# Patient Record
Sex: MALE | Race: BLACK OR AFRICAN AMERICAN | NOT HISPANIC OR LATINO | Employment: FULL TIME | URBAN - METROPOLITAN AREA
[De-identification: names, ages, dates, MRNs, and addresses within clinical notes are randomized per-mention and may not be internally consistent; named-entity substitution may affect disease eponyms.]

---

## 2021-08-24 ENCOUNTER — CLINICAL SUPPORT (OUTPATIENT)
Dept: URGENT CARE | Facility: CLINIC | Age: 66
End: 2021-08-24
Payer: MEDICAID

## 2021-08-24 DIAGNOSIS — Z78.9 NO KNOWN HEALTH PROBLEMS: Primary | ICD-10-CM

## 2021-08-24 LAB
CTP QC/QA: YES
SARS-COV-2 RDRP RESP QL NAA+PROBE: NEGATIVE

## 2021-08-24 PROCEDURE — 87635: ICD-10-PCS | Mod: QW,S$GLB,, | Performed by: FAMILY MEDICINE

## 2021-08-24 PROCEDURE — 99211 PR OFFICE/OUTPT VISIT, EST, LEVL I: ICD-10-PCS | Mod: S$GLB,,, | Performed by: FAMILY MEDICINE

## 2021-08-24 PROCEDURE — 87635 SARS-COV-2 COVID-19 AMP PRB: CPT | Mod: QW,S$GLB,, | Performed by: FAMILY MEDICINE

## 2021-08-24 PROCEDURE — 99211 OFF/OP EST MAY X REQ PHY/QHP: CPT | Mod: S$GLB,,, | Performed by: FAMILY MEDICINE

## 2023-05-18 ENCOUNTER — HOSPITAL ENCOUNTER (EMERGENCY)
Facility: HOSPITAL | Age: 68
Discharge: HOME | End: 2023-05-18
Attending: EMERGENCY MEDICINE
Payer: MEDICARE

## 2023-05-18 VITALS
TEMPERATURE: 98 F | RESPIRATION RATE: 16 BRPM | BODY MASS INDEX: 30.24 KG/M2 | SYSTOLIC BLOOD PRESSURE: 149 MMHG | HEIGHT: 71 IN | OXYGEN SATURATION: 95 % | HEART RATE: 69 BPM | DIASTOLIC BLOOD PRESSURE: 87 MMHG | WEIGHT: 216 LBS

## 2023-05-18 DIAGNOSIS — R22.1 NECK MASS: ICD-10-CM

## 2023-05-18 DIAGNOSIS — J02.9 SORE THROAT: Primary | ICD-10-CM

## 2023-05-18 PROBLEM — R51.9 RIGHT FACIAL PAIN: Status: ACTIVE | Noted: 2023-05-18

## 2023-05-18 PROBLEM — H92.01 EAR PAIN, RIGHT: Status: ACTIVE | Noted: 2023-05-18

## 2023-05-18 LAB
BASOPHILS # BLD AUTO: 0.02 K/UL (ref 0–0.2)
BASOPHILS NFR BLD: 0.3 % (ref 0–1.9)
BUN SERPL-MCNC: 18 MG/DL (ref 6–30)
CHLORIDE SERPL-SCNC: 104 MMOL/L (ref 95–110)
CREAT SERPL-MCNC: 0.9 MG/DL (ref 0.5–1.4)
DIFFERENTIAL METHOD: ABNORMAL
EOSINOPHIL # BLD AUTO: 0 K/UL (ref 0–0.5)
EOSINOPHIL NFR BLD: 0.6 % (ref 0–8)
ERYTHROCYTE [DISTWIDTH] IN BLOOD BY AUTOMATED COUNT: 12.3 % (ref 11.5–14.5)
GLUCOSE SERPL-MCNC: 115 MG/DL (ref 70–110)
GROUP A STREP, MOLECULAR: NEGATIVE
HCT VFR BLD AUTO: 42.5 % (ref 40–54)
HCT VFR BLD CALC: 43 %PCV (ref 36–54)
HCV AB SERPL QL IA: NORMAL
HGB BLD-MCNC: 14.2 G/DL (ref 14–18)
HIV 1+2 AB+HIV1 P24 AG SERPL QL IA: NORMAL
IMM GRANULOCYTES # BLD AUTO: 0.02 K/UL (ref 0–0.04)
IMM GRANULOCYTES NFR BLD AUTO: 0.3 % (ref 0–0.5)
LYMPHOCYTES # BLD AUTO: 0.7 K/UL (ref 1–4.8)
LYMPHOCYTES NFR BLD: 10.7 % (ref 18–48)
MCH RBC QN AUTO: 31.2 PG (ref 27–31)
MCHC RBC AUTO-ENTMCNC: 33.4 G/DL (ref 32–36)
MCV RBC AUTO: 93 FL (ref 82–98)
MONOCYTES # BLD AUTO: 0.6 K/UL (ref 0.3–1)
MONOCYTES NFR BLD: 8.4 % (ref 4–15)
NEUTROPHILS # BLD AUTO: 5.2 K/UL (ref 1.8–7.7)
NEUTROPHILS NFR BLD: 79.7 % (ref 38–73)
NRBC BLD-RTO: 0 /100 WBC
PLATELET # BLD AUTO: 141 K/UL (ref 150–450)
PMV BLD AUTO: 9.8 FL (ref 9.2–12.9)
POC IONIZED CALCIUM: 1.14 MMOL/L (ref 1.06–1.42)
POC TCO2 (MEASURED): 23 MMOL/L (ref 23–29)
POTASSIUM BLD-SCNC: 3.7 MMOL/L (ref 3.5–5.1)
RBC # BLD AUTO: 4.55 M/UL (ref 4.6–6.2)
SAMPLE: ABNORMAL
SODIUM BLD-SCNC: 139 MMOL/L (ref 136–145)
WBC # BLD AUTO: 6.55 K/UL (ref 3.9–12.7)

## 2023-05-18 PROCEDURE — 63600175 PHARM REV CODE 636 W HCPCS: Performed by: EMERGENCY MEDICINE

## 2023-05-18 PROCEDURE — 80047 BASIC METABLC PNL IONIZED CA: CPT

## 2023-05-18 PROCEDURE — 96365 THER/PROPH/DIAG IV INF INIT: CPT

## 2023-05-18 PROCEDURE — 25500020 PHARM REV CODE 255: Performed by: EMERGENCY MEDICINE

## 2023-05-18 PROCEDURE — 99284 EMERGENCY DEPT VISIT MOD MDM: CPT | Mod: ,,, | Performed by: EMERGENCY MEDICINE

## 2023-05-18 PROCEDURE — 87651 STREP A DNA AMP PROBE: CPT | Performed by: EMERGENCY MEDICINE

## 2023-05-18 PROCEDURE — 87389 HIV-1 AG W/HIV-1&-2 AB AG IA: CPT | Performed by: PHYSICIAN ASSISTANT

## 2023-05-18 PROCEDURE — 86803 HEPATITIS C AB TEST: CPT | Performed by: PHYSICIAN ASSISTANT

## 2023-05-18 PROCEDURE — 25000003 PHARM REV CODE 250: Performed by: EMERGENCY MEDICINE

## 2023-05-18 PROCEDURE — 99285 EMERGENCY DEPT VISIT HI MDM: CPT | Mod: 25

## 2023-05-18 PROCEDURE — 99284 PR EMERGENCY DEPT VISIT,LEVEL IV: ICD-10-PCS | Mod: ,,, | Performed by: EMERGENCY MEDICINE

## 2023-05-18 PROCEDURE — 96375 TX/PRO/DX INJ NEW DRUG ADDON: CPT | Mod: 59

## 2023-05-18 PROCEDURE — 85025 COMPLETE CBC W/AUTO DIFF WBC: CPT | Performed by: EMERGENCY MEDICINE

## 2023-05-18 RX ORDER — KETOROLAC TROMETHAMINE 30 MG/ML
15 INJECTION, SOLUTION INTRAMUSCULAR; INTRAVENOUS
Status: COMPLETED | OUTPATIENT
Start: 2023-05-18 | End: 2023-05-18

## 2023-05-18 RX ORDER — DEXAMETHASONE SODIUM PHOSPHATE 4 MG/ML
10 INJECTION, SOLUTION INTRA-ARTICULAR; INTRALESIONAL; INTRAMUSCULAR; INTRAVENOUS; SOFT TISSUE
Status: COMPLETED | OUTPATIENT
Start: 2023-05-18 | End: 2023-05-18

## 2023-05-18 RX ADMIN — KETOROLAC TROMETHAMINE 15 MG: 30 INJECTION, SOLUTION INTRAMUSCULAR; INTRAVENOUS at 11:05

## 2023-05-18 RX ADMIN — DEXAMETHASONE SODIUM PHOSPHATE 10 MG: 4 INJECTION INTRA-ARTICULAR; INTRALESIONAL; INTRAMUSCULAR; INTRAVENOUS; SOFT TISSUE at 11:05

## 2023-05-18 RX ADMIN — AMPICILLIN SODIUM AND SULBACTAM SODIUM 3 G: 2; 1 INJECTION, POWDER, FOR SOLUTION INTRAMUSCULAR; INTRAVENOUS at 11:05

## 2023-05-18 RX ADMIN — IOHEXOL 75 ML: 350 INJECTION, SOLUTION INTRAVENOUS at 01:05

## 2023-05-18 NOTE — CONSULTS
[facility] - Emergency Dept  Otorhinolaryngology-Head & Neck Surgery  Consult Note    Patient Name: Yassine Thornton  MRN: [id number]  Code Status: No Order  Admission Date: 5/18/2023  Hospital Length of Stay: 0 days  Attending Physician: No att. providers found  Primary Care Provider: Primary Doctor No    Patient information was obtained from patient and ER records.     Inpatient consult to ENT  Consult performed by: Kassidy Farr MD  Consult ordered by: Edinson Galeano DO        Subjective:     Chief Complaint/Reason for Admission: right ear and facial pain    History of Present Illness: This is a 67 yo male with history of HTN and hairy cell leukemia treated with chemotherapy who presents with right sided ear and facial pain and swelling x 1 day. Reports that pain started in his ear last night and progressed down to his jaw. Endorses odynophagia. Denies fevers, chills, otorrhea, rhinorrhea, shortness of breath, recent infections, weight loss. He is a non-smoker and non-drinker. CT necks shows enhancement of soft tissue in the right palatine tonsil extending along the glossotonsillar sulcus with an enlarged cervical right sided lymph node. ED treating for infectious cause but concern for malignancy prompted ENT consult. Patient has been treated with Decadron 10mg and Unasyn x 1 with improvement of symptoms.       Medications:  Continuous Infusions:  Scheduled Meds:  PRN Meds:     No current facility-administered medications on file prior to encounter.     No current outpatient medications on file prior to encounter.       Review of patient's allergies indicates:  Not on File    No past medical history on file.  No past surgical history on file.  Family History    None       Tobacco Use    Smoking status: Not on file    Smokeless tobacco: Not on file   Substance and Sexual Activity    Alcohol use: Not on file    Drug use: Not on file    Sexual activity: Not on file     Review of Systems  Objective:     Vital Signs (Most  Recent):  Temp: 98.4 °F (36.9 °C) (05/18/23 1432)  Pulse: 69 (05/18/23 1432)  Resp: 16 (05/18/23 1432)  BP: (!) 149/87 (05/18/23 1432)  SpO2: 95 % (05/18/23 1432) Vital Signs (24h Range):  Temp:  [98.4 °F (36.9 °C)-98.5 °F (36.9 °C)] 98.4 °F (36.9 °C)  Pulse:  [69-82] 69  Resp:  [16] 16  SpO2:  [95 %-97 %] 95 %  BP: (125-149)/(85-87) 149/87     Weight: 98 kg (216 lb)  Body mass index is 30.13 kg/m².         Physical Exam  NAD  EOMI  CNs grossly intact  OP: MMM, uvula midline, fullness and mild erythema to the right tonsil, firm to deep palpation, non-tender.  Neck: Enlarged level 2b LN, tender to palpation, firm, mobile       Significant Labs:  Recent Lab Results         05/18/23  1104   05/18/23  1058   05/18/23  1057        Group A Strep, Molecular   Negative  Comment: Arcanobacterium haemolyticum and Beta Streptococcus group C   and G will not be detected by this test method.  Please order   Throat Culture ([id number]) if suspected.           Baso #     0.02       Basophil %     0.3       Differential Method     Automated       Eos #     0.0       Eosinophil %     0.6       Gran # (ANC)     5.2       Gran %     79.7       Hematocrit     42.5       Hemoglobin     14.2       Hepatitis C Ab     Non-reactive       HIV 1/2 Ag/Ab     Non-reactive       Immature Grans (Abs)     0.02  Comment: Mild elevation in immature granulocytes is non specific and   can be seen in a variety of conditions including stress response,   acute inflammation, trauma and pregnancy. Correlation with other   laboratory and clinical findings is essential.         Immature Granulocytes     0.3       Lymph #     0.7       Lymph %     10.7       MCH     31.2       MCHC     33.4       MCV     93       Mono #     0.6       Mono %     8.4       MPV     9.8       nRBC     0       Platelets     141       POC BUN 18           POC Chloride 104           POC Creatinine 0.9           POC Glucose 115           POC Hematocrit 43           POC Ionized Calcium  1.14           POC Potassium 3.7           POC Sodium 139           POC TCO2 (MEASURED) 23           RBC     4.55       RDW     12.3       Sample JIMBO           WBC     6.55               Significant Diagnostics:  CT neck with contrast 5/18/23  1. Assessment of the oral cavity and oropharynx is markedly compromised by artifact related to prior dental restorations. Noting this, there is suggestion of peripheral enhancing soft tissue centered in the region of the right palatine tonsil extending along the glossotonsillar sulcus, difficult to measure precisely given the above-described artifact, but measuring roughly 19 x 9 x 17 mm.  This location of this abnormality could contribute to ipsilateral referred otalgia in the given clinical context.  Finding is concerning for possible malignancy, with infectious or inflammatory etiology additionally considered. Correlation with direct visualization and possible biopsy would be recommended.    2. Prominent number and mildly enlarged upper right cervical lymph nodes, likely related to the above.  Attention on follow-up would be recommended.      Assessment/Plan:     Right facial pain  69 yo male with history of HTN and hairy cell leukemia treated with chemotherapy presenting with new onset on right ear and facial pain and swelling. CT demonstrates enhancement of soft tissue in the right palatine tonsil extending along the glossotonsillar sulcus with an enlarged cervical right sided lymph node. Differential includes infectious etiology vs malignant process. He has been afebrile and without leukocytosis. Due to concern for malignancy, we will have him follow up in outpatient ENT clinic for workup.     - No acute ENT interventions   - Follow up in ENT clinic in 1-2 weeks.  - Please call ENT with any questions or concerns.             VTE Risk Mitigation (From admission, onward)      None            Thank you for your consult. I will sign off. Please contact us if you have any  additional questions.    Kassidy Farr MD  Otorhinolaryngology-Head & Neck Surgery  [facility] - Emergency Dept

## 2023-05-18 NOTE — SUBJECTIVE & OBJECTIVE
Medications:  Continuous Infusions:  Scheduled Meds:  PRN Meds:     No current facility-administered medications on file prior to encounter.     No current outpatient medications on file prior to encounter.       Review of patient's allergies indicates:  Not on File    No past medical history on file.  No past surgical history on file.  Family History    None       Tobacco Use    Smoking status: Not on file    Smokeless tobacco: Not on file   Substance and Sexual Activity    Alcohol use: Not on file    Drug use: Not on file    Sexual activity: Not on file     Review of Systems  Objective:     Vital Signs (Most Recent):  Temp: 98.4 °F (36.9 °C) (05/18/23 1432)  Pulse: 69 (05/18/23 1432)  Resp: 16 (05/18/23 1432)  BP: (!) 149/87 (05/18/23 1432)  SpO2: 95 % (05/18/23 1432) Vital Signs (24h Range):  Temp:  [98.4 °F (36.9 °C)-98.5 °F (36.9 °C)] 98.4 °F (36.9 °C)  Pulse:  [69-82] 69  Resp:  [16] 16  SpO2:  [95 %-97 %] 95 %  BP: (125-149)/(85-87) 149/87     Weight: 98 kg (216 lb)  Body mass index is 30.13 kg/m².         Physical Exam  NAD  EOMI  CNs grossly intact  OP: MMM, uvula midline, fullness and mild erythema to the right tonsil, firm to deep palpation, non-tender.  Neck: Enlarged level 2b LN, tender to palpation, firm, mobile       Significant Labs:  Recent Lab Results         05/18/23  1104   05/18/23  1058   05/18/23  1057        Group A Strep, Molecular   Negative  Comment: Arcanobacterium haemolyticum and Beta Streptococcus group C   and G will not be detected by this test method.  Please order   Throat Culture ([id number]) if suspected.           Baso #     0.02       Basophil %     0.3       Differential Method     Automated       Eos #     0.0       Eosinophil %     0.6       Gran # (ANC)     5.2       Gran %     79.7       Hematocrit     42.5       Hemoglobin     14.2       Hepatitis C Ab     Non-reactive       HIV 1/2 Ag/Ab     Non-reactive       Immature Grans (Abs)     0.02  Comment: Mild elevation in  immature granulocytes is non specific and   can be seen in a variety of conditions including stress response,   acute inflammation, trauma and pregnancy. Correlation with other   laboratory and clinical findings is essential.         Immature Granulocytes     0.3       Lymph #     0.7       Lymph %     10.7       MCH     31.2       MCHC     33.4       MCV     93       Mono #     0.6       Mono %     8.4       MPV     9.8       nRBC     0       Platelets     141       POC BUN 18           POC Chloride 104           POC Creatinine 0.9           POC Glucose 115           POC Hematocrit 43           POC Ionized Calcium 1.14           POC Potassium 3.7           POC Sodium 139           POC TCO2 (MEASURED) 23           RBC     4.55       RDW     12.3       Sample JIMBO           WBC     6.55               Significant Diagnostics:  CT neck with contrast 5/18/23  1. Assessment of the oral cavity and oropharynx is markedly compromised by artifact related to prior dental restorations. Noting this, there is suggestion of peripheral enhancing soft tissue centered in the region of the right palatine tonsil extending along the glossotonsillar sulcus, difficult to measure precisely given the above-described artifact, but measuring roughly 19 x 9 x 17 mm.  This location of this abnormality could contribute to ipsilateral referred otalgia in the given clinical context.  Finding is concerning for possible malignancy, with infectious or inflammatory etiology additionally considered. Correlation with direct visualization and possible biopsy would be recommended.    2. Prominent number and mildly enlarged upper right cervical lymph nodes, likely related to the above.  Attention on follow-up would be recommended.

## 2023-05-18 NOTE — ED PROVIDER NOTES
Encounter Date: 5/18/2023       History     Chief Complaint   Patient presents with    Facial Pain     Pain and swelling to r ear and r side of face     HPI    Yassine Thornton is a 68-year-old male with no significant medical history presenting with swelling and pain to his right ear, right-sided face and right side of his throat.  He endorses sore throat, pharyngitis with difficulty swallowing and pain with eating.  He denies any fevers or chills but reports pain with swallowing, pain extending to his ear and right side of his face.  He denies having any swelling that is notable.  He denies any fevers, chills, nausea, vomiting, chest pain, lightheadedness, dizziness or headaches.  Denies any other symptoms.  No previous history of strep throat.  He denies any pain with opening his mouth.    Allergies:  No known drug allergies  Prior medical history:  Hypertension, hairy cell leukemia treated with chemotherapy  Prior surgical history:  Denies previous surgery  Social history:  Denies tobacco abuse    Review of patient's allergies indicates:  Not on File  No past medical history on file.  No past surgical history on file.  No family history on file.     Review of Systems  All other systems reviewed and were negative; see HPI also for additional ROS.    Physical Exam     Initial Vitals [05/18/23 1009]   BP Pulse Resp Temp SpO2   125/85 82 16 98.5 °F (36.9 °C) 97 %      MAP       --         Physical Exam    Nursing note and vitals reviewed.    Constitutional: Well-developed. Well-nourished.  No significant distress, nontoxic appearing   HENT: NCAT. OP moist. Uvula midline. No OP masses. Posterior pharynx with no erythema.  There is fullness and erythema to the right tonsil, nontender. No tonsillar edema on the left. No exudate. Floor of the mouth is soft, tongue is not elevated. No rhinorrhea. TMs clear bilaterally. Mastoid process nontender bilaterally.  EYES: No conjunctival injection or discharge.  NECK: Full  ROM. Supple. No rigidity.  Enlarged lymph node on the right neck tender to palpation firm and mobile.  No stridor. Brudzinskis test negative.  CARDIAC: RRR. No murmurs or rubs. Strong distal pulses.  PULM: Normal effort. Breath sounds clear bilaterally. No wheezes. No crackles.  ABD: Soft, non-tender, non-distended, normal BS. No guarding. No rebound.  : No CVA tenderness.  MS: Warm and well perfused. No edema..  NEURO: Alert and oriented x3.  No sensory or motor deficits.  Negative Romberg.  Normal gate.  SKIN: Dry. No rashes.  No cyanosis or jaundice.  PSYCH: Normal judgment. Normal affect.        ED Course   Procedures  Labs Reviewed   CBC W/ AUTO DIFFERENTIAL - Abnormal; Notable for the following components:       Result Value    RBC 4.55 (*)     MCH 31.2 (*)     Platelets 141 (*)     Lymph # 0.7 (*)     Gran % 79.7 (*)     Lymph % 10.7 (*)     All other components within normal limits    Narrative:     Release to patient->Immediate   ISTAT PROCEDURE - Abnormal; Notable for the following components:    POC Glucose 115 (*)     All other components within normal limits   GROUP A STREP, MOLECULAR   HIV 1 / 2 ANTIBODY    Narrative:     Release to patient->Immediate   HEPATITIS C ANTIBODY    Narrative:     Release to patient->Immediate   ISTAT CHEM8          Imaging Results               CT Soft Tissue Neck With Contrast (Final result)  Result time 05/18/23 13:26:11      Final result by Erik Madrid MD (05/18/23 13:26:11)                   Impression:      1. Assessment of the oral cavity and oropharynx is markedly compromised by artifact related to prior dental restorations. Noting this, there is suggestion of peripheral enhancing soft tissue centered in the region of the right palatine tonsil extending along the glossotonsillar sulcus, difficult to measure precisely given the above-described artifact, but measuring roughly 19 x 9 x 17 mm.  This location of this abnormality could contribute to ipsilateral  referred otalgia in the given clinical context.  Finding is concerning for possible malignancy, with infectious or inflammatory etiology additionally considered.  Correlation with direct visualization and possible biopsy would be recommended.  2. Prominent number and mildly enlarged upper right cervical lymph nodes, likely related to the above.  Attention on follow-up would be recommended.  This report was flagged in Epic as abnormal.      Electronically signed by: Erik Madrid  Date:    05/18/2023  Time:    13:26               Narrative:    EXAMINATION:  CT SOFT TISSUE NECK WITH CONTRAST    CLINICAL HISTORY:  Tonsil/adenoid disorder;Neck abscess, deep tissue;    TECHNIQUE:  Low dose axial images as well as sagittal and coronal reconstructions were performed from the skull base to the clavicles following the intravenous administration of 75 mL of Omnipaque 350.    COMPARISON:  None    FINDINGS:  Treatment changes: No surgical or radiation changes are evident.    Mucosal space:    Assessment of the oral cavity and oropharynx is markedly compromised by artifact related to prior dental restorations.    Noting this, there is suggestion of peripheral enhancing soft tissue centered in the region of the right palatine tonsil extending along the glossotonsillar sulcus, difficult to measure precisely given the above-described artifact, but measuring roughly 19 x 9 x 17 mm (as seen on axial series 2, image 62 and sagittal reformat series 602, image 131).    Elsewhere, no definite additional enhancing mucosal based lesions are noted along the course of the visualized air digestive tract.    Skull base: No definite abnormality.    Lymph nodes: Prominent in number and mildly enlarged upper right cervical lymph nodes are noted.  Reference right level IIa node measures 18 mm long axis (series 2, image 73).    Parotid and submandibular glands: No focal lesions are identified.    Thyroid: Mild heterogeneity of the left lobe of the  thyroid gland with possible subcentimeter hypoattenuating nodule.    Vascular structures: The major vascular structures in the neck are patent.    Orbits: Normal.    Visualized intracranial contents: Unremarkable within the limitations of this exam.  Suspected arachnoid granulation within the right transverse sinus.    Paranasal sinuses: Chronic near complete opacification of the right sphenoid sinus with chronic osteitis of the walls and isthmus excretions and or fungal elements internally.  Relatively minimal paranasal sinus mucosal thickening noted elsewhere.  Partial opacification of the right greater left dependent mastoid air cells.    Bones: No acute abnormality.  Degenerative findings in the spine noted.  Periapical lucency about the right maxillary 1st molar, which could represent periapical abscess.    Lung apices: Clear    Other findings:                                    X-Rays:   Independently Interpreted Readings:   Other Readings:  CT soft tissue neck:  Concern for inflammatory versus malignancy posterior to the right tonsil extending to the base of the tongue.  Medications   dexAMETHasone injection 10 mg (10 mg Intravenous Given 5/18/23 1107)   ketorolac injection 15 mg (15 mg Intravenous Given 5/18/23 1105)   ampicillin-sulbactam (UNASYN) 3 g in sodium chloride 0.9 % 100 mL IVPB (MB+) (0 g Intravenous Stopped 5/18/23 1238)   iohexoL (OMNIPAQUE 350) injection 100 mL (75 mLs Intravenous Given 5/18/23 1303)     Medical Decision Making:   History:   Old Medical Records: I decided to obtain old medical records.  Initial Assessment:   Yassine Thornton is a 68-year-old male with no significant medical history presenting with swelling and pain to his right ear, right-sided face and right side of his throat.  Differential Diagnosis:   PTA, tonsillitis, pharyngitis, strep pharyngitis, malignancy, deep space infection  Independently Interpreted Test(s):   I have ordered and independently interpreted X-rays  - see prior notes.  Clinical Tests:   Lab Tests: Ordered and Reviewed  Radiological Study: Ordered and Reviewed  Other:   I have discussed this case with another health care provider.       <> Summary of the Discussion: ENT - evaluated patient at bedside     Patient is afebrile vital signs are stable.  His symptoms are acute and severe.  Physical exam findings remarkable for cervical lymphadenopathy in the right neck as well as increased edema and erythema along the right tonsillar fossa.  I do not see a PTA or evidence of RPA.  He is tender along the right neck extending to the posterior auricular region.  He does report swelling.  Broad workup including infectious given erythematous and edematous tonsil and peritonsillar region.  Labs show no leukocytosis, no acidosis and no significant anemia.  Initially treated with Unasyn and dexamethasone with improvement in symptoms.  He also received anti-inflammatory Toradol.  He is strep negative.  Given extensive tenderness in the cervical limb for region, a CT contrast soft tissue neck was obtained.  This reveals a mass that is posterior to the tonsil and extending approximately 2 cm towards the tongue base.  On my evaluation and read of the CT is concerning for malignancy.  Is confirmed by Radiology.  Discussed findings with ENT who evaluated the patient at bedside.  They will plan for outpatient follow-up with ENT head neck team with close follow-up.  I discussed these findings with the patient and ENT at length.  Will continue anti-inflammatory treatment for now and outpatient follow-up.  No airway involvement, airway issues, stridor or trismus. Patient agreeable to discharge plan. Strict ED precautions and return instructions discussed at length and patient verbalized understanding. All questions were answered and ample time was given for questions.      Complexity:  High-risk        ED Course as of 05/18/23 1634   Thu May 18, 2023   1350 Discussed CT findings with  ENT who will evaluate the patient [RC]      ED Course User Index  [RC] Edinson Galeano DO                 Clinical Impression:   Final diagnoses:  [J02.9] Sore throat (Primary)  [R22.1] Neck mass        ED Disposition Condition    Discharge Stable          ED Prescriptions    None       Follow-up Information       Follow up With Specialties Details Why Contact Info    Sammi Dealney MD Otolaryngology Follow up in 2 week(s)  [address] [facility] [address]  [phone]              Edinson Galeano DO, [facility]  Emergency Staff Physician   Dept of Emergency Medicine   [facility]  Spectralink: [id number]        Disclaimer: This note has been generated using voice-recognition software. There may be typographical errors that have been missed during proof-reading.       Edinson Galeano DO  05/18/23 0856

## 2023-05-18 NOTE — ED NOTES
Yassine Thornton, a 68 y.o. male presents to the ED w/ complaint of right side of face    Triage note:  Chief Complaint   Patient presents with    Facial Pain     Pain and swelling to r ear and r side of face     Review of patient's allergies indicates:  Not on File  No past medical history on file.    LOC: Patient name and date of birth verified. The patient is awake, alert and aware of environment with an appropriate affect, the patient is oriented x 3 and speaking appropriately.   APPEARANCE: Patient resting comfortably, patient is clean and well groomed, patient's clothing is properly fastened.  SKIN: The skin is warm and dry, color consistent with ethnicity, patient has normal skin turgor and moist mucus membranes, skin intact, no breakdown or bruising noted.  MUSCULOSKELETAL: Patient moving all extremities well, no obvious swelling or deformities noted.   RESPIRATORY: Respirations are spontaneous, patient has a normal effort and rate, no accessory muscle use noted.  CARDIAC: Patient has a normal rate and rhythm, no periphreal edema noted, capillary refill < 3 seconds.  ABDOMEN: Soft and non tender to palpation, no distention noted. Bowel sounds present in all four quadrants.  NEUROLOGIC: Eyes open spontaneously, behavior appropriate to situation, follows commands, facial expression symmetrical, bilateral hand grasp equal and even, purposeful motor response noted, normal sensation in all extremities when touched with a finger.

## 2023-05-18 NOTE — ASSESSMENT & PLAN NOTE
67 yo male with history of HTN and hairy cell leukemia treated with chemotherapy presenting with new onset on right ear and facial pain and swelling. CT demonstrates enhancement of soft tissue in the right palatine tonsil extending along the glossotonsillar sulcus with an enlarged cervical right sided lymph node. Differential includes infectious etiology vs malignant process. He has been afebrile and without leukocytosis. Due to concern for malignancy, we will have him follow up in outpatient ENT clinic for workup.     - No acute ENT interventions   - Follow up in ENT clinic in 1-2 weeks.  - Please call ENT with any questions or concerns.

## 2023-05-18 NOTE — DISCHARGE INSTRUCTIONS
Today, your exam shows that you have a sore throat and inflamed throat in the right side.  This does not appear to be infectious at this time.  We suspect you have swelling in your neck which could be a mass or could be benign.  We have set up a follow-up for you with ENT head neck team for follow-up.  They will call you for follow-up appointment.  If you develops any difficulty breathing, or any worsening symptoms follow up sooner.  You may take ibuprofen or Aleve for your symptoms.  You may also take Tylenol.    Our goal in the emergency department is to always give you outstanding care and exceptional service. You may receive a survey by mail or e-mail in the next week regarding your experience in our ED. We would greatly appreciate your completing and returning the survey. Your feedback provides us with a way to recognize our staff who give very good care and it helps us learn how to improve when your experience was below our aspiration of excellence.

## 2023-06-12 ENCOUNTER — OFFICE VISIT (OUTPATIENT)
Dept: OTOLARYNGOLOGY | Facility: CLINIC | Age: 68
End: 2023-06-12
Payer: MEDICARE

## 2023-06-12 VITALS
BODY MASS INDEX: 30.09 KG/M2 | HEIGHT: 71 IN | HEART RATE: 76 BPM | WEIGHT: 214.94 LBS | SYSTOLIC BLOOD PRESSURE: 119 MMHG | DIASTOLIC BLOOD PRESSURE: 78 MMHG

## 2023-06-12 DIAGNOSIS — J02.9 SORE THROAT: ICD-10-CM

## 2023-06-12 DIAGNOSIS — R22.1 NECK MASS: ICD-10-CM

## 2023-06-12 PROCEDURE — 31575 PR LARYNGOSCOPY, FLEXIBLE; DIAGNOSTIC: ICD-10-PCS | Mod: S$PBB,,, | Performed by: OTOLARYNGOLOGY

## 2023-06-12 PROCEDURE — 99214 OFFICE O/P EST MOD 30 MIN: CPT | Mod: 25,S$PBB,, | Performed by: OTOLARYNGOLOGY

## 2023-06-12 PROCEDURE — 99999 PR PBB SHADOW E&M-EST. PATIENT-LVL IV: ICD-10-PCS | Mod: PBBFAC,,, | Performed by: OTOLARYNGOLOGY

## 2023-06-12 PROCEDURE — 99214 PR OFFICE/OUTPT VISIT, EST, LEVL IV, 30-39 MIN: ICD-10-PCS | Mod: 25,S$PBB,, | Performed by: OTOLARYNGOLOGY

## 2023-06-12 PROCEDURE — 99999 PR PBB SHADOW E&M-EST. PATIENT-LVL IV: CPT | Mod: PBBFAC,,, | Performed by: OTOLARYNGOLOGY

## 2023-06-12 PROCEDURE — 99214 OFFICE O/P EST MOD 30 MIN: CPT | Mod: PBBFAC,25 | Performed by: OTOLARYNGOLOGY

## 2023-06-12 PROCEDURE — 31575 DIAGNOSTIC LARYNGOSCOPY: CPT | Mod: S$PBB,,, | Performed by: OTOLARYNGOLOGY

## 2023-06-12 PROCEDURE — 31575 DIAGNOSTIC LARYNGOSCOPY: CPT | Mod: PBBFAC | Performed by: OTOLARYNGOLOGY

## 2023-06-12 RX ORDER — TAMSULOSIN HYDROCHLORIDE 0.4 MG/1
1 CAPSULE ORAL NIGHTLY
COMMUNITY
Start: 2023-06-02

## 2023-06-12 RX ORDER — PANTOPRAZOLE SODIUM 40 MG/1
40 TABLET, DELAYED RELEASE ORAL
COMMUNITY
Start: 2023-03-29

## 2023-06-12 RX ORDER — FLUTICASONE PROPIONATE 50 MCG
2 SPRAY, SUSPENSION (ML) NASAL DAILY
Qty: 18.2 ML | Refills: 11 | Status: SHIPPED | OUTPATIENT
Start: 2023-06-12 | End: 2023-07-12

## 2023-06-12 RX ORDER — MELOXICAM 7.5 MG/1
7.5 TABLET ORAL DAILY PRN
COMMUNITY
Start: 2023-02-15

## 2023-06-12 RX ORDER — AMLODIPINE BESYLATE 10 MG/1
10 TABLET ORAL
COMMUNITY

## 2023-06-12 RX ORDER — LINACLOTIDE 72 UG/1
72 CAPSULE, GELATIN COATED ORAL
COMMUNITY
Start: 2023-02-08

## 2023-06-12 RX ORDER — DUTASTERIDE 0.5 MG/1
0.5 CAPSULE, LIQUID FILLED ORAL
COMMUNITY
Start: 2023-06-02

## 2023-06-12 NOTE — PATIENT INSTRUCTIONS
Please start Nasal Saline irrigation with NeilMed sinus rinse or equivalent over the counter once a day. Please use Distilled or Boiled water with packets as instructed on packaging.  Start Flonase as prescribed and spray to the outside (towards eye/ear) as show in clinic

## 2023-06-12 NOTE — PROGRESS NOTES
History of Present Illness:   Yassine Thornton is a 68 y.o. year old male evaluated on 6/13/2023, in the Otolaryngology-Head and Neck Surgery Clinic at [facility]. The patient was referred by Dr. Galeano for evaluation in ER follow-up after visit for sore throat.  Patient was seen in the ER on 05/18/2023 with sore throat symptoms that had an acute onset.  Had CT scan done showing concern for mass versus infection.  He report his throat symptoms have essentially resolved with some continued bilateral ear pain that fluctuates.  He reports this is sharp.  He denies any hemoptysis odynophagia dysphagia or neck mass.  He is a nonsmoker.  He has known reflux disease.  He also has some allergies but has never been tested and has not had any recent ear nose and throat surgery.            Past Medical/Surgical History  History reviewed. No pertinent past medical history.  His  has no past surgical history on file.     Past Family/Social History  His family history is not on file.  He       Medications/Allergies/Immunizations  His current medication(s) include:   Current Outpatient Medications   Medication Sig Dispense Refill    amLODIPine (NORVASC) 10 MG tablet Take 10 mg by mouth.      dutasteride (AVODART) 0.5 mg capsule Take 0.5 mg by mouth.      LINZESS 72 mcg Cap capsule Take 72 mcg by mouth.      meloxicam (MOBIC) 7.5 MG tablet Take 7.5 mg by mouth daily as needed.      pantoprazole (PROTONIX) 40 MG tablet Take 40 mg by mouth.      tamsulosin (FLOMAX) 0.4 mg Cap Take 1 capsule by mouth every evening.      fluticasone propionate (FLONASE) 50 mcg/actuation nasal spray 2 sprays (100 mcg total) by Each Nostril route once daily. 18.2 mL 11     No current facility-administered medications for this visit.        Allergies: Patient has no known allergies.     Immunizations:   There is no immunization history on file for this patient.      Review of Systems   Constitutional: Negative for fever, weight loss and  "weight gain.  Skin: Negative for rash, itchiness, dryness  HENT:  As per HPI  Cardiovascular: Negative for chest pain and dyspnea on exertion .   Respiratory: Is not experiencing shortness of breath.   Gastrointestinal: Negative for nausea and vomiting.   Neurological: Negative for headaches.   Lymph/Heme: Negative for lymphadenopathy or easy bruising  Musculoskeletal: Negative for joint or muscle pain  Psychiatric: The patient is not nervous/anxious.        All other systems are negative except for that listed in the HPI.      PHYSICAL EXAM:   Vital Signs:  /78 (BP Location: Right arm, Patient Position: Sitting, BP Method: Large (Automatic))   Pulse 76   Ht 5' 11" (1.803 m)   Wt 97.5 kg (214 lb 15.2 oz)   BMI 29.98 kg/m²      General:  Well-developed, well-nourished  Communication and Voice:  Clear pitch and clarity  Hearing: Hearing adequate for verbal communication bilaterally   Inspection:  Normocephalic and atraumatic without mass or lesion  Palpation:  Facial skeleton intact without bony stepoffs  Parotid Glands:  No mass or tenderness  Facial Strength:  Facial motility symmetric and full bilaterally  Pinna:  External ear intact and fully developed  External canal:  Canal is patent with intact skin  Tympanic Membrane:  Clear and mobile  External nose:  No scar or anatomic deformity  Internal Nose:  Septum intact and midline.  No edema, polyp, or rhinorrhea.  TMJ:  No pain to palpation with full mobility  Oral cavity, Lips, Teeth, and Gums:  Mucosa and teeth intact and viable, No lesions, masses or ulcers  Oropharynx: No erythema or exudate, no masses or ulcerations, tonsils are 1 to 2+ without any sign of mass on palpation and visualization.  Nasopharynx:  No mass or lesion with intact mucosa  Hypopharynx:  Not well visualized secondary to gagging  Larynx:  Not well visualized secondary to gagging  Neck, Trachea, Lymphatics:  Midline trachea without mass or lesion, no lymphadenopathy  Thyroid:  No " mass or nodularity  Eyes: No nystagmus with equal extraocular motion bilaterally  Neuro/Psych/Balance: Patient oriented and appropriate in interaction;  Appropriate mood and affect;  Gait is intact with no imbalance; Cranial nerves I-XII are intact  Respiratory effort:  Equal inspiration and expiration without stridor  Peripheral Vascular:  Warm extremities with equal pulses     Procedure: Flexible fiberoptic nasopharyngoscopy/laryngoscopy   Indications: Need for detailed exam, hyperactive gag reflex, inadequate mirror visualization.  Surgeon: Sammi Delaney MD  Procedure note/findings: After informed discussion of the risks, benefits, and alternatives after indications as noted above, a fiberoptic nasopharyngoscopy and laryngoscopy was recommended for above indications, and the patient consented to this. Nasal cavities were topically anesthetized and decongested with 4% lidocaine and Afrin solutions after which a flexible scope was easily advanced without difficulty into the left and right nasal cavities as well as into the nasopharynx. Nasal cavities were intact without gross mass or lesion. Nasopharynx, similarly, revealed no mass lesion or granularity. There was no ulceration. There was no gross asymmetry. Fossa of Rosenmueller was intact bilaterally. The eustachian tube orifices were intact bilaterally and patent. Posterior and lateral nasal pharyngeal walls were intact and symmetric without ulceration, mass, or granularity. Scope was now advanced distally. Visible oropharynx including lateral walls and tongue base was clear without lesion. Larynx and hypopharynx were examined. Larynx was intact with normal true vocal cord mobility bilaterally. No discrete masses or lesions in any location. Hypopharynx was clear without asymmetry.  Airway was patent. The scope was then withdrawn and removed. He tolerated this well.      RADIOLOGIC REVIEW:    I have personally reviewed the scan with very likely trapped fluid at  time of acute illness with no underlying mass.  I went over the results with the patient.      CT scan neck with contrast 05/18/2023   Impression:     1. Assessment of the oral cavity and oropharynx is markedly compromised by artifact related to prior dental restorations. Noting this, there is suggestion of peripheral enhancing soft tissue centered in the region of the right palatine tonsil extending along the glossotonsillar sulcus, difficult to measure precisely given the above-described artifact, but measuring roughly 19 x 9 x 17 mm.  This location of this abnormality could contribute to ipsilateral referred otalgia in the given clinical context.  Finding is concerning for possible malignancy, with infectious or inflammatory etiology additionally considered.  Correlation with direct visualization and possible biopsy would be recommended.  2. Prominent number and mildly enlarged upper right cervical lymph nodes, likely related to the above.  Attention on follow-up would be recommended.    ASSESSMENT:   1. Sore throat    2. Neck mass            PLAN:   Given patient's resolution of pain and symptoms this is very likely of infectious etiology without underlying mass.  No mass was visualized today on thorough physical examination and fiberoptic laryngoscopy.  Warning signs of head and neck malignancy were discussed in detail.  No further workup needed at that time if he has any recurrent symptoms would re scan in the future.  As far as his allergies I recommended nasal saline irrigation as well as Flonase which was prescribed.    I believe that Mr. Tohrnton has a good understanding of the issues involved and I answered all of his questions.     DISCLAIMER: This note was prepared with [email or web address] voice recognition transcription software. Garbled syntax, mangled pronouns, and other bizarre constructions may be attributed to that software system. While efforts were made to correct any mistakes made by this voice  recognition program, some errors and/or omissions may remain in the note that were missed when the note was originally created.    Answers submitted by the patient for this visit:  Review of Symptoms Questionnaire  (Submitted on 6/9/2023)  None of these: Yes  ear pain: Yes  sore throat: Yes  None of these : Yes  None of these: Yes  None of these : Yes  None of these: Yes  None of these: Yes  None of these: Yes  None of these : Yes  None of these: Yes  None of these : Yes  None of these: Yes  None of these: Yes  None of these: Yes